# Patient Record
Sex: MALE | Race: WHITE | URBAN - METROPOLITAN AREA
[De-identification: names, ages, dates, MRNs, and addresses within clinical notes are randomized per-mention and may not be internally consistent; named-entity substitution may affect disease eponyms.]

---
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
## 2022-01-17 NOTE — PROCEDURE: COUNSELING
Erythromycin Counseling:  I discussed with the patient the risks of erythromycin including but not limited to GI upset, allergic reaction, drug rash, diarrhea, increase in liver enzymes, and yeast infections.
Birth Control Pills Pregnancy And Lactation Text: This medication should be avoided if pregnant and for the first 30 days post-partum.
Tetracycline Counseling: Patient counseled regarding possible photosensitivity and increased risk for sunburn.  Patient instructed to avoid sunlight, if possible.  When exposed to sunlight, patients should wear protective clothing, sunglasses, and sunscreen.  The patient was instructed to call the office immediately if the following severe adverse effects occur:  hearing changes, easy bruising/bleeding, severe headache, or vision changes.  The patient verbalized understanding of the proper use and possible adverse effects of tetracycline.  All of the patient's questions and concerns were addressed. Patient understands to avoid pregnancy while on therapy due to potential birth defects.
High Dose Vitamin A Pregnancy And Lactation Text: High dose vitamin A therapy is contraindicated during pregnancy and breast feeding.
Tazorac Pregnancy And Lactation Text: This medication is not safe during pregnancy. It is unknown if this medication is excreted in breast milk.
Dapsone Counseling: I discussed with the patient the risks of dapsone including but not limited to hemolytic anemia, agranulocytosis, rashes, methemoglobinemia, kidney failure, peripheral neuropathy, headaches, GI upset, and liver toxicity.  Patients who start dapsone require monitoring including baseline LFTs and weekly CBCs for the first month, then every month thereafter.  The patient verbalized understanding of the proper use and possible adverse effects of dapsone.  All of the patient's questions and concerns were addressed.
Azithromycin Pregnancy And Lactation Text: This medication is considered safe during pregnancy and is also secreted in breast milk.
Minocycline Counseling: Patient advised regarding possible photosensitivity and discoloration of the teeth, skin, lips, tongue and gums.  Patient instructed to avoid sunlight, if possible.  When exposed to sunlight, patients should wear protective clothing, sunglasses, and sunscreen.  The patient was instructed to call the office immediately if the following severe adverse effects occur:  hearing changes, easy bruising/bleeding, severe headache, or vision changes.  The patient verbalized understanding of the proper use and possible adverse effects of minocycline.  All of the patient's questions and concerns were addressed.
Erythromycin Pregnancy And Lactation Text: This medication is Pregnancy Category B and is considered safe during pregnancy. It is also excreted in breast milk.
Topical Clindamycin Counseling: Patient counseled that this medication may cause skin irritation or allergic reactions.  In the event of skin irritation, the patient was advised to reduce the amount of the drug applied or use it less frequently.   The patient verbalized understanding of the proper use and possible adverse effects of clindamycin.  All of the patient's questions and concerns were addressed.
Tetracycline Pregnancy And Lactation Text: This medication is Pregnancy Category D and not consider safe during pregnancy. It is also excreted in breast milk.
Dapsone Pregnancy And Lactation Text: This medication is Pregnancy Category C and is not considered safe during pregnancy or breast feeding.
Use Enhanced Medication Counseling?: No
Bactrim Counseling:  I discussed with the patient the risks of sulfa antibiotics including but not limited to GI upset, allergic reaction, drug rash, diarrhea, dizziness, photosensitivity, and yeast infections.  Rarely, more serious reactions can occur including but not limited to aplastic anemia, agranulocytosis, methemoglobinemia, blood dyscrasias, liver or kidney failure, lung infiltrates or desquamative/blistering drug rashes.
Isotretinoin Counseling: Patient should get monthly blood tests, not donate blood, not drive at night if vision affected, not share medication, and not undergo elective surgery for 6 months after tx completed. Side effects reviewed, pt to contact office should one occur.
Benzoyl Peroxide Counseling: Patient counseled that medicine may cause skin irritation and bleach clothing.  In the event of skin irritation, the patient was advised to reduce the amount of the drug applied or use it less frequently.   The patient verbalized understanding of the proper use and possible adverse effects of benzoyl peroxide.  All of the patient's questions and concerns were addressed.
Bactrim Pregnancy And Lactation Text: This medication is Pregnancy Category D and is known to cause fetal risk.  It is also excreted in breast milk.
Topical Clindamycin Pregnancy And Lactation Text: This medication is Pregnancy Category B and is considered safe during pregnancy. It is unknown if it is excreted in breast milk.
Isotretinoin Pregnancy And Lactation Text: This medication is Pregnancy Category X and is considered extremely dangerous during pregnancy. It is unknown if it is excreted in breast milk.
Doxycycline Counseling:  Patient counseled regarding possible photosensitivity and increased risk for sunburn.  Patient instructed to avoid sunlight, if possible.  When exposed to sunlight, patients should wear protective clothing, sunglasses, and sunscreen.  The patient was instructed to call the office immediately if the following severe adverse effects occur:  hearing changes, easy bruising/bleeding, severe headache, or vision changes.  The patient verbalized understanding of the proper use and possible adverse effects of doxycycline.  All of the patient's questions and concerns were addressed.
Topical Retinoid Pregnancy And Lactation Text: This medication is Pregnancy Category C. It is unknown if this medication is excreted in breast milk.
Benzoyl Peroxide Pregnancy And Lactation Text: This medication is Pregnancy Category C. It is unknown if benzoyl peroxide is excreted in breast milk.
Spironolactone Counseling: Patient advised regarding risks of diarrhea, abdominal pain, hyperkalemia, birth defects (for female patients), liver toxicity and renal toxicity. The patient may need blood work to monitor liver and kidney function and potassium levels while on therapy. The patient verbalized understanding of the proper use and possible adverse effects of spironolactone.  All of the patient's questions and concerns were addressed.
Birth Control Pills Counseling: Birth Control Pill Counseling: I discussed with the patient the potential side effects of OCPs including but not limited to increased risk of stroke, heart attack, thrombophlebitis, deep venous thrombosis, hepatic adenomas, breast changes, GI upset, headaches, and depression.  The patient verbalized understanding of the proper use and possible adverse effects of OCPs. All of the patient's questions and concerns were addressed.
Topical Sulfur Applications Counseling: Topical Sulfur Counseling: Patient counseled that this medication may cause skin irritation or allergic reactions.  In the event of skin irritation, the patient was advised to reduce the amount of the drug applied or use it less frequently.   The patient verbalized understanding of the proper use and possible adverse effects of topical sulfur application.  All of the patient's questions and concerns were addressed.
Detail Level: Detailed
Doxycycline Pregnancy And Lactation Text: This medication is Pregnancy Category D and not consider safe during pregnancy. It is also excreted in breast milk but is considered safe for shorter treatment courses.
Spironolactone Pregnancy And Lactation Text: This medication can cause feminization of the male fetus and should be avoided during pregnancy. The active metabolite is also found in breast milk.
High Dose Vitamin A Counseling: Side effects reviewed, pt to contact office should one occur.
Topical Sulfur Applications Pregnancy And Lactation Text: This medication is Pregnancy Category C and has an unknown safety profile during pregnancy. It is unknown if this topical medication is excreted in breast milk.
Azithromycin Counseling:  I discussed with the patient the risks of azithromycin including but not limited to GI upset, allergic reaction, drug rash, diarrhea, and yeast infections.
Topical Retinoid counseling:  Patient advised to apply a pea-sized amount only at bedtime and wait 30 minutes after washing their face before applying.  If too drying, patient may add a non-comedogenic moisturizer. The patient verbalized understanding of the proper use and possible adverse effects of retinoids.  All of the patient's questions and concerns were addressed.
Tazorac Counseling:  Patient advised that medication is irritating and drying.  Patient may need to apply sparingly and wash off after an hour before eventually leaving it on overnight.  The patient verbalized understanding of the proper use and possible adverse effects of tazorac.  All of the patient's questions and concerns were addressed.
Home

## 2024-02-08 ENCOUNTER — OV NP (OUTPATIENT)
Dept: URBAN - METROPOLITAN AREA CLINIC 9 | Facility: CLINIC | Age: 24
End: 2024-02-08
Payer: MEDICARE

## 2024-02-08 VITALS
DIASTOLIC BLOOD PRESSURE: 69 MMHG | BODY MASS INDEX: 25.48 KG/M2 | HEIGHT: 69 IN | WEIGHT: 172 LBS | SYSTOLIC BLOOD PRESSURE: 118 MMHG

## 2024-02-08 DIAGNOSIS — K92.1 BLOOD IN THE STOOL: ICD-10-CM

## 2024-02-08 PROCEDURE — 99204 OFFICE O/P NEW MOD 45 MIN: CPT | Performed by: STUDENT IN AN ORGANIZED HEALTH CARE EDUCATION/TRAINING PROGRAM

## 2024-02-08 RX ORDER — INSULIN LISPRO 100 [IU]/ML
INJECT 100-120 UNITS SUBCUTANEOUSLY AS DIRECTED DAILY INJECTION, SOLUTION INTRAVENOUS; SUBCUTANEOUS
Qty: 30 MILLILITER | Refills: 0 | Status: ACTIVE | COMMUNITY

## 2024-02-08 RX ORDER — INSULIN LISPRO 100 [IU]/ML
INJECTION, SOLUTION INTRAVENOUS; SUBCUTANEOUS
Qty: 15 MILLILITER | Status: DISCONTINUED | COMMUNITY

## 2024-02-08 RX ORDER — INSULIN GLARGINE 100 [IU]/ML
INJECTION, SOLUTION SUBCUTANEOUS
Qty: 12 MILLILITER | Status: ACTIVE | COMMUNITY

## 2024-02-08 NOTE — HPI-TODAY'S VISIT:
Patient has a history of DM1 who presents for blood in stool  GI Hx: 2/8/24- 2 months-- every time has BM, has mucous and blood mixed in with the stool. After BM, will have blood dripping into the toilet. No abd pain. Mild rectalgia.   ROS includes and is negative for the below, unless specified positive above: Denies weight loss, fever, chills, abdominal pain, nausea, vomiting, diarrhea.  Denies dysphagia, reflux, UGI symptoms. Denies hematemesis, melena, hematochezia, blood per rectum.  Family hx:  Mother- crohns  EGD: None  Colonoscopy: NOne  Imaging/Studies/Procedures: 1/9/23- CMP, TSH, CBC

## 2024-02-14 ENCOUNTER — LAB (OUTPATIENT)
Dept: URBAN - METROPOLITAN AREA CLINIC 4 | Facility: CLINIC | Age: 24
End: 2024-02-14
Payer: MEDICARE

## 2024-02-14 ENCOUNTER — COLON (OUTPATIENT)
Dept: URBAN - METROPOLITAN AREA SURGERY CENTER 9 | Facility: SURGERY CENTER | Age: 24
End: 2024-02-14
Payer: MEDICARE

## 2024-02-14 DIAGNOSIS — K63.89 OTHER SPECIFIED DISEASES OF INTESTINE: ICD-10-CM

## 2024-02-14 DIAGNOSIS — K51.911 ULCERATIVE COLITIS, UNSPECIFIED WITH RECTAL BLEEDING: ICD-10-CM

## 2024-02-14 PROCEDURE — 45380 COLONOSCOPY AND BIOPSY: CPT | Performed by: STUDENT IN AN ORGANIZED HEALTH CARE EDUCATION/TRAINING PROGRAM

## 2024-02-14 PROCEDURE — 88305 TISSUE EXAM BY PATHOLOGIST: CPT | Performed by: PATHOLOGY

## 2024-02-14 RX ORDER — INSULIN GLARGINE 100 [IU]/ML
INJECTION, SOLUTION SUBCUTANEOUS
Qty: 12 MILLILITER | Status: ACTIVE | COMMUNITY

## 2024-02-14 RX ORDER — INSULIN LISPRO 100 [IU]/ML
INJECT 100-120 UNITS SUBCUTANEOUSLY AS DIRECTED DAILY INJECTION, SOLUTION INTRAVENOUS; SUBCUTANEOUS
Qty: 30 MILLILITER | Refills: 0 | Status: ACTIVE | COMMUNITY

## 2024-02-21 PROBLEM — 64766004: Status: ACTIVE | Noted: 2024-02-21

## 2024-03-21 ENCOUNTER — OV EP (OUTPATIENT)
Dept: URBAN - METROPOLITAN AREA CLINIC 9 | Facility: CLINIC | Age: 24
End: 2024-03-21
Payer: COMMERCIAL

## 2024-03-21 VITALS
WEIGHT: 173 LBS | HEIGHT: 69 IN | BODY MASS INDEX: 25.62 KG/M2 | SYSTOLIC BLOOD PRESSURE: 112 MMHG | DIASTOLIC BLOOD PRESSURE: 69 MMHG

## 2024-03-21 DIAGNOSIS — R19.7 DIARRHEA, UNSPECIFIED TYPE: ICD-10-CM

## 2024-03-21 DIAGNOSIS — Z11.59 SCREENING FOR VIRAL DISEASE: ICD-10-CM

## 2024-03-21 DIAGNOSIS — Z11.1 SCREENING EXAMINATION FOR PULMONARY TUBERCULOSIS: ICD-10-CM

## 2024-03-21 DIAGNOSIS — K51.80 OTHER ULCERATIVE COLITIS WITHOUT COMPLICATION: ICD-10-CM

## 2024-03-21 DIAGNOSIS — K92.1 BLOOD IN THE STOOL: ICD-10-CM

## 2024-03-21 DIAGNOSIS — Z91.89 HIGH RISK FOR COLON CANCER: ICD-10-CM

## 2024-03-21 PROCEDURE — 99214 OFFICE O/P EST MOD 30 MIN: CPT | Performed by: STUDENT IN AN ORGANIZED HEALTH CARE EDUCATION/TRAINING PROGRAM

## 2024-03-21 RX ORDER — INSULIN LISPRO 100 [IU]/ML
INJECT 100-120 UNITS SUBCUTANEOUSLY AS DIRECTED DAILY INJECTION, SOLUTION INTRAVENOUS; SUBCUTANEOUS
Qty: 30 MILLILITER | Refills: 0 | Status: ACTIVE | COMMUNITY

## 2024-03-21 RX ORDER — MESALAMINE 1.2 G/1
2 TABLETS WITH A MEAL TABLET, DELAYED RELEASE ORAL TWICE A DAY
Qty: 120 TABLET | Refills: 3 | Status: ACTIVE | COMMUNITY
Start: 2024-02-21 | End: 2024-06-20

## 2024-03-21 RX ORDER — INSULIN GLARGINE 100 [IU]/ML
INJECTION, SOLUTION SUBCUTANEOUS
Qty: 12 MILLILITER | Status: ACTIVE | COMMUNITY

## 2024-03-21 RX ORDER — MESALAMINE 1.2 G/1
2 TABLETS WITH A MEAL TABLET, DELAYED RELEASE ORAL TWICE A DAY
Qty: 360 TABLET | Refills: 3 | OUTPATIENT
Start: 2024-02-21 | End: 2025-03-16

## 2024-03-21 NOTE — HPI-TODAY'S VISIT:
Patient has a history of DM1, UC,  who presents for follow up  GI Hx: 2/8/24- 2 months-- every time has BM, has mucous and blood mixed in with the stool. After BM, will have blood dripping into the toilet. No abd pain. Mild rectalgia.  3/21/24- On Meslaamine, feels much better. Still with blood and urgency.   ROS includes and is negative for the below, unless specified positive above: Denies weight loss, fever, chills, abdominal pain, nausea, vomiting, diarrhea.  Denies dysphagia, reflux, UGI symptoms. Denies hematemesis, melena, hematochezia, blood per rectum.  Family hx:  Mother- crohns  EGD: None  Colonoscopy: 2/14/24- nodular ileal mucosa (bx: prominent mucosal lymphoid aggregates), moderate inflammation of the rectum and appendix 2/2 colitis? (bx: cecum- pathcy chronic active colitis w/ skin lesion of UC, rectum- diffuse chronic active proctitis w/ UC). rest of colon normal (bx: R/L colon- normal). findings are suspicious for UC with cecal patch.   Imaging/Studies/Procedures: 1/9/23- CMP, TSH, CBC 1/25/24- CBC, CMP, lipase normal.  CT A/P 1/25/24- mild splenomegaly, fatty liver, borderline L hydronephrosis, constipation.

## 2024-05-06 ENCOUNTER — TELEPHONE ENCOUNTER (OUTPATIENT)
Dept: URBAN - METROPOLITAN AREA CLINIC 63 | Facility: CLINIC | Age: 24
End: 2024-05-06

## 2024-05-09 ENCOUNTER — OFFICE VISIT (OUTPATIENT)
Dept: URBAN - METROPOLITAN AREA CLINIC 9 | Facility: CLINIC | Age: 24
End: 2024-05-09

## 2024-05-09 ENCOUNTER — OFFICE VISIT (OUTPATIENT)
Dept: URBAN - METROPOLITAN AREA CLINIC 9 | Facility: CLINIC | Age: 24
End: 2024-05-09
Payer: SELF-PAY

## 2024-05-09 ENCOUNTER — DASHBOARD ENCOUNTERS (OUTPATIENT)
Age: 24
End: 2024-05-09

## 2024-05-09 VITALS
WEIGHT: 171 LBS | DIASTOLIC BLOOD PRESSURE: 80 MMHG | SYSTOLIC BLOOD PRESSURE: 120 MMHG | HEIGHT: 69 IN | BODY MASS INDEX: 25.33 KG/M2

## 2024-05-09 DIAGNOSIS — Z91.89 HIGH RISK FOR COLON CANCER: ICD-10-CM

## 2024-05-09 DIAGNOSIS — K51.80 OTHER ULCERATIVE COLITIS WITHOUT COMPLICATION: ICD-10-CM

## 2024-05-09 DIAGNOSIS — K92.1 BLOOD IN THE STOOL: ICD-10-CM

## 2024-05-09 DIAGNOSIS — R19.7 DIARRHEA, UNSPECIFIED TYPE: ICD-10-CM

## 2024-05-09 PROCEDURE — 99213 OFFICE O/P EST LOW 20 MIN: CPT | Performed by: STUDENT IN AN ORGANIZED HEALTH CARE EDUCATION/TRAINING PROGRAM

## 2024-05-09 RX ORDER — MESALAMINE 1.2 G/1
2 TABLETS WITH A MEAL TABLET, DELAYED RELEASE ORAL TWICE A DAY
Qty: 360 TABLET | Refills: 3 | Status: ACTIVE | COMMUNITY
Start: 2024-02-21 | End: 2025-03-16

## 2024-05-09 RX ORDER — INSULIN LISPRO 100 [IU]/ML
INJECT 100-120 UNITS SUBCUTANEOUSLY AS DIRECTED DAILY INJECTION, SOLUTION INTRAVENOUS; SUBCUTANEOUS
Qty: 30 MILLILITER | Refills: 0 | Status: ACTIVE | COMMUNITY

## 2024-05-09 RX ORDER — MESALAMINE 1.2 G/1
2 TABLETS WITH A MEAL TABLET, DELAYED RELEASE ORAL TWICE A DAY
Qty: 360 TABLET | Refills: 3 | OUTPATIENT

## 2024-05-09 RX ORDER — INSULIN GLARGINE 100 [IU]/ML
INJECTION, SOLUTION SUBCUTANEOUS
Qty: 12 MILLILITER | Status: ACTIVE | COMMUNITY

## 2024-05-10 ENCOUNTER — OFFICE VISIT (OUTPATIENT)
Dept: URBAN - METROPOLITAN AREA CLINIC 9 | Facility: CLINIC | Age: 24
End: 2024-05-10

## 2024-08-08 ENCOUNTER — OFFICE VISIT (OUTPATIENT)
Dept: URBAN - METROPOLITAN AREA CLINIC 9 | Facility: CLINIC | Age: 24
End: 2024-08-08

## 2024-08-09 ENCOUNTER — OFFICE VISIT (OUTPATIENT)
Dept: URBAN - METROPOLITAN AREA CLINIC 9 | Facility: CLINIC | Age: 24
End: 2024-08-09

## 2024-09-26 ENCOUNTER — OFFICE VISIT (OUTPATIENT)
Dept: URBAN - METROPOLITAN AREA CLINIC 9 | Facility: CLINIC | Age: 24
End: 2024-09-26

## 2024-09-26 RX ORDER — INSULIN LISPRO 100 [IU]/ML
INJECT 100-120 UNITS SUBCUTANEOUSLY AS DIRECTED DAILY INJECTION, SOLUTION INTRAVENOUS; SUBCUTANEOUS
Qty: 30 MILLILITER | Refills: 0 | COMMUNITY

## 2024-09-26 RX ORDER — MESALAMINE 1.2 G/1
2 TABLETS WITH A MEAL TABLET, DELAYED RELEASE ORAL TWICE A DAY
Qty: 360 TABLET | Refills: 3 | COMMUNITY

## 2024-09-26 RX ORDER — INSULIN GLARGINE 100 [IU]/ML
INJECTION, SOLUTION SUBCUTANEOUS
Qty: 12 MILLILITER | COMMUNITY

## 2024-09-26 NOTE — HPI-TODAY'S VISIT:
EGD: None  Colonoscopy: 2/14/24- nodular ileal mucosa (bx: prominent mucosal lymphoid aggregates), moderate inflammation of the rectum and appendix 2/2 colitis? (bx: cecum- pathcy chronic active colitis w/ skin lesion of UC, rectum- diffuse chronic active proctitis w/ UC). rest of colon normal (bx: R/L colon- normal). findings are suspicious for UC with cecal patch.   Imaging/Studies/Procedures: 1/9/23- CMP, TSH, CBC 1/25/24- CBC, CMP, lipase normal.  CT A/P 1/25/24- mild splenomegaly, fatty liver, borderline L hydronephrosis, constipation.  4/11/24- CMP, CBC, TSH, qunatiferon, Hep B s Ag neg.   - -   PMH:  DM1, UC,   Family Hx: Mother- crohns  GI Hx: 2/8/24- 2 months-- every time has BM, has mucous and blood mixed in with the stool. After BM, will have blood dripping into the toilet. No abd pain. Mild rectalgia.  3/21/24- On Meslaamine, feels much better. Still with blood and urgency.  9/26/24- stool cx, calpro not done

## 2024-10-01 ENCOUNTER — OFFICE VISIT (OUTPATIENT)
Dept: URBAN - METROPOLITAN AREA CLINIC 9 | Facility: CLINIC | Age: 24
End: 2024-10-01
Payer: SELF-PAY

## 2024-10-01 ENCOUNTER — TELEPHONE ENCOUNTER (OUTPATIENT)
Dept: URBAN - METROPOLITAN AREA CLINIC 9 | Facility: CLINIC | Age: 24
End: 2024-10-01

## 2024-10-01 VITALS
HEIGHT: 69 IN | BODY MASS INDEX: 23.64 KG/M2 | DIASTOLIC BLOOD PRESSURE: 67 MMHG | WEIGHT: 159.6 LBS | SYSTOLIC BLOOD PRESSURE: 119 MMHG

## 2024-10-01 DIAGNOSIS — K51.80 OTHER ULCERATIVE COLITIS WITHOUT COMPLICATION: ICD-10-CM

## 2024-10-01 DIAGNOSIS — K92.1 BLOOD IN THE STOOL: ICD-10-CM

## 2024-10-01 DIAGNOSIS — Z11.59 SCREENING FOR VIRAL DISEASE: ICD-10-CM

## 2024-10-01 DIAGNOSIS — R19.7 DIARRHEA, UNSPECIFIED TYPE: ICD-10-CM

## 2024-10-01 DIAGNOSIS — Z91.89 HIGH RISK FOR COLON CANCER: ICD-10-CM

## 2024-10-01 DIAGNOSIS — Z11.1 SCREENING EXAMINATION FOR PULMONARY TUBERCULOSIS: ICD-10-CM

## 2024-10-01 PROCEDURE — 99213 OFFICE O/P EST LOW 20 MIN: CPT | Performed by: STUDENT IN AN ORGANIZED HEALTH CARE EDUCATION/TRAINING PROGRAM

## 2024-10-01 RX ORDER — INSULIN GLARGINE 100 [IU]/ML
INJECTION, SOLUTION SUBCUTANEOUS
Qty: 12 MILLILITER | Status: ACTIVE | COMMUNITY

## 2024-10-01 RX ORDER — MESALAMINE 1.2 G/1
2 TABLETS WITH A MEAL TABLET, DELAYED RELEASE ORAL TWICE A DAY
Qty: 360 TABLET | Refills: 3 | OUTPATIENT

## 2024-10-01 RX ORDER — INSULIN LISPRO 100 [IU]/ML
AS DIRECTED INJECTION, SOLUTION INTRAVENOUS; SUBCUTANEOUS ONCE DAILY
Refills: 0 | Status: ACTIVE | COMMUNITY

## 2024-10-01 RX ORDER — MESALAMINE 1.2 G/1
2 TABLETS WITH A MEAL TABLET, DELAYED RELEASE ORAL TWICE A DAY
Qty: 360 TABLET | Refills: 3 | Status: ACTIVE | COMMUNITY

## 2024-10-01 NOTE — HPI-TODAY'S VISIT:
EGD: None  Colonoscopy: 2/14/24- nodular ileal mucosa (bx: prominent mucosal lymphoid aggregates), moderate inflammation of the rectum and appendix 2/2 colitis? (bx: cecum- pathcy chronic active colitis w/ skin lesion of UC, rectum- diffuse chronic active proctitis w/ UC). rest of colon normal (bx: R/L colon- normal). findings are suspicious for UC with cecal patch.   Imaging/Studies/Procedures: 1/9/23- CMP, TSH, CBC 1/25/24- CBC, CMP, lipase normal.  CT A/P 1/25/24- mild splenomegaly, fatty liver, borderline L hydronephrosis, constipation.  4/11/24- CMP, CBC, TSH, qunatiferon, Hep B s Ag neg.   - -   PMH:  DM1, UC,   Family Hx: Mother- crohns  GI Hx: 2/8/24- 2 months-- every time has BM, has mucous and blood mixed in with the stool. After BM, will have blood dripping into the toilet. No abd pain. Mild rectalgia.  3/21/24- On Meslaamine, feels much better. Still with blood and urgency.  10/1/24- stool cx, calpro not done The patient, a 24-year-old male, presented to the clinic for a follow-up visit. He has been on mesalamine, taking 444 pills a day for an unspecified condition. The patient reported that his symptoms, which included blood in stool, urgency, and diarrhea, have been well controlled and he feels back to normal. The doctor noted that the patient's condition is stable and well controlled on the current medication. The doctor also discussed the importance of monitoring certain parameters on a yearly basis while on mesalamine. The doctor suggested a calprotectin level test, a stool marker, to establish a baseline level. This would help in future if the patient's symptoms worsen, allowing the doctor to compare the new level with the baseline, instead of immediately resorting to a colonoscopy. The doctor also recommended annual testing of kidney function and electrolytes, which the patient's primary care doctor would typically conduct. The patient reported no other symptoms or concerns during the visit.

## 2025-07-21 ENCOUNTER — OFFICE VISIT (OUTPATIENT)
Dept: URBAN - METROPOLITAN AREA CLINIC 9 | Facility: CLINIC | Age: 25
End: 2025-07-21

## 2025-08-26 ENCOUNTER — OFFICE VISIT (OUTPATIENT)
Dept: URBAN - METROPOLITAN AREA CLINIC 9 | Facility: CLINIC | Age: 25
End: 2025-08-26

## 2025-08-26 RX ORDER — MESALAMINE 1.2 G/1
2 TABLETS WITH A MEAL TABLET, DELAYED RELEASE ORAL TWICE A DAY
Qty: 360 TABLET | Refills: 3 | OUTPATIENT

## 2025-08-26 RX ORDER — MESALAMINE 1.2 G/1
2 TABLETS WITH A MEAL TABLET, DELAYED RELEASE ORAL TWICE A DAY
Qty: 360 TABLET | Refills: 3 | Status: ACTIVE | COMMUNITY

## 2025-08-26 RX ORDER — INSULIN GLARGINE 100 [IU]/ML
INJECTION, SOLUTION SUBCUTANEOUS
Qty: 12 MILLILITER | Status: ACTIVE | COMMUNITY

## 2025-08-26 RX ORDER — INSULIN LISPRO 100 [IU]/ML
AS DIRECTED INJECTION, SOLUTION INTRAVENOUS; SUBCUTANEOUS ONCE DAILY
Refills: 0 | Status: ACTIVE | COMMUNITY